# Patient Record
Sex: FEMALE | URBAN - METROPOLITAN AREA
[De-identification: names, ages, dates, MRNs, and addresses within clinical notes are randomized per-mention and may not be internally consistent; named-entity substitution may affect disease eponyms.]

---

## 2021-02-24 ENCOUNTER — MEDICAL CORRESPONDENCE (OUTPATIENT)
Dept: HEALTH INFORMATION MANAGEMENT | Facility: CLINIC | Age: 31
End: 2021-02-24

## 2021-02-24 ENCOUNTER — TRANSFERRED RECORDS (OUTPATIENT)
Dept: HEALTH INFORMATION MANAGEMENT | Facility: CLINIC | Age: 31
End: 2021-02-24

## 2021-02-26 ENCOUNTER — TRANSFERRED RECORDS (OUTPATIENT)
Dept: HEALTH INFORMATION MANAGEMENT | Facility: CLINIC | Age: 31
End: 2021-02-26

## 2021-03-11 ENCOUNTER — TELEPHONE (OUTPATIENT)
Dept: OPHTHALMOLOGY | Facility: CLINIC | Age: 31
End: 2021-03-11

## 2021-03-11 NOTE — TELEPHONE ENCOUNTER
We received a referral for a patient back towards the end of Feb. However the patient's demographics did not match what we have in Epic other than her last name and   We called the phone number sent to us in the referral to see if this was the same patient as the one we have here in [facility], and to schedule her to come in to be seen by a neuro ophthalmologist.  Patient has not returned the phone calls we have left.    Received another referral for patient on . She went to see another eye doctor who then referred patient to come see neuro ophthalmology for an exam. On 3/9/21, I called patient to see if I can schedule her and to verify the demographics from what we have in [facility] to the referral. Patient did not return phone call.    I attempted to reach patient today and was unsuccessful. Left her a voicemail.